# Patient Record
Sex: MALE | Race: WHITE
[De-identification: names, ages, dates, MRNs, and addresses within clinical notes are randomized per-mention and may not be internally consistent; named-entity substitution may affect disease eponyms.]

---

## 2021-10-01 ENCOUNTER — HOSPITAL ENCOUNTER (EMERGENCY)
Dept: HOSPITAL 47 - EC | Age: 51
LOS: 1 days | Discharge: HOME | End: 2021-10-02
Payer: COMMERCIAL

## 2021-10-01 VITALS — RESPIRATION RATE: 20 BRPM

## 2021-10-01 DIAGNOSIS — S43.004A: Primary | ICD-10-CM
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
## 2021-10-01 PROCEDURE — 96374 THER/PROPH/DIAG INJ IV PUSH: CPT

## 2021-10-01 PROCEDURE — 96375 TX/PRO/DX INJ NEW DRUG ADDON: CPT

## 2021-10-01 PROCEDURE — 99284 EMERGENCY DEPT VISIT MOD MDM: CPT

## 2021-10-01 PROCEDURE — 73030 X-RAY EXAM OF SHOULDER: CPT

## 2021-10-01 PROCEDURE — 96372 THER/PROPH/DIAG INJ SC/IM: CPT

## 2021-10-01 PROCEDURE — 73020 X-RAY EXAM OF SHOULDER: CPT

## 2021-10-01 PROCEDURE — 23650 CLTX SHO DSLC W/MNPJ WO ANES: CPT

## 2021-10-01 PROCEDURE — 73060 X-RAY EXAM OF HUMERUS: CPT

## 2021-10-01 NOTE — ED
Upper Extremity HPI





- General


Source: patient, RN notes reviewed


Mode of arrival: ambulatory





<Mercedes Strickland - Last Filed: 10/02/21 00:22>





<Carlos Tesfaye - Last Filed: 10/02/21 00:29>





- General


Chief Complaint: Extremity Injury, Upper


Stated Complaint: Fall 8 Ft,R shoulder injury


Time Seen by Provider: 10/01/21 19:44





- History of Present Illness


Initial Comments: 





Patient is a 51-year-old male presenting to the emergency department with 

complaints of right shoulder pain after he fell just prior to arrival.  Patient 

states he was attempting to climb up the ladder on his dear blind when he went 

to reach for another step and the step broke off, he fell mostly landing on his 

right shoulder.  He was about 7-8 feet up.  Denies loss of consciousness, did 

not hit his head.  His only complaint today is right shoulder pain.  He states 

he felt a little soreness in his left hip when he got up, but is able to 

ambulate without difficulty.  He denies any chest pain or shortness of breath, 

no rib pain, no abdominal pain.  Denies any nausea or vomiting.  He is not on 

blood thinners.  He has no further complaints at this time. (Mercedes Strickland)





- Related Data


                                    Allergies











Allergy/AdvReac Type Severity Reaction Status Date / Time


 


No Known Allergies Allergy   Verified 10/01/21 19:32














Review of Systems


ROS Other: All systems not noted in ROS Statement are negative.





<Mercedes Strickland - Last Filed: 10/02/21 00:22>


ROS Other: All systems not noted in ROS Statement are negative.





<Carlos Tesfaye - Last Filed: 10/02/21 00:29>


ROS Statement: 


Those systems with pertinent positive or pertinent negative responses have been 

documented in the HPI.








Past Medical History


Past Medical History: No Reported History


History of Any Multi-Drug Resistant Organisms: None Reported


Past Surgical History: No Surgical Hx Reported


Past Psychological History: No Psychological Hx Reported


Smoking Status: Never smoker


Past Alcohol Use History: Occasional


Past Drug Use History: None Reported





<Mercedes Strickland - Last Filed: 10/02/21 00:22>





General Exam





<Mercedes Strickland - Last Filed: 10/02/21 00:22>


General appearance: alert, in no apparent distress


Head exam: Present: atraumatic, normocephalic, normal inspection


Eye exam: Present: normal appearance, PERRL, EOMI.  Absent: scleral icterus, 

conjunctival injection, periorbital swelling


ENT exam: Present: normal exam, mucous membranes moist


Neck exam: Present: normal inspection.  Absent: tenderness, meningismus, 

lymphadenopathy


Respiratory exam: Present: normal lung sounds bilaterally.  Absent: respiratory 

distress, wheezes, rales, rhonchi, stridor


Cardiovascular Exam: Present: regular rate, normal rhythm, normal heart sounds. 

Absent: systolic murmur, diastolic murmur, rubs, gallop, clicks


GI/Abdominal exam: Present: soft, normal bowel sounds.  Absent: distended, 

tenderness, guarding, rebound, rigid


Extremities exam: Present: normal inspection, full ROM, normal capillary refill.

 Absent: tenderness, pedal edema, joint swelling, calf tenderness


Back exam: Present: normal inspection


Neurological exam: Present: alert, oriented X3, CN II-XII intact


Psychiatric exam: Present: normal affect, normal mood


Skin exam: Present: warm, dry, intact, normal color.  Absent: rash





<Carlos Tesfaye - Last Filed: 10/02/21 00:29>





- General Exam Comments


Initial Comments: 





GENERAL: 


Patient is well-developed and well-nourished.  Patient is nontoxic and in mild 

distress.





HEAD: 


Atraumatic, normocephalic.  He has no hematomas.





EYES:


Pupils equal round and reactive to light, extraocular movements intact, sclera 

anicteric, conjunctiva are normal.  Eyelids were unremarkable.





ENT: 


Nares patent, oropharynx clear without exudates.  Moist mucous membranes.





NECK: 


Normal range of motion, supple without lymphadenopathy or JVD.  No midline 

tenderness.





LUNGS:


Unlabored respirations.  Breath sounds clear to auscultation bilaterally and 

equal.  No wheezes rales or rhonchi.





HEART:


Regular rate and rhythm without murmurs, rubs or gallops.





ABDOMEN: 


Soft, nontender, normoactive bowel sounds.  No guarding, no rebound.  No masses 

appreciated.





: Deferred 





MUSCULOSKELETAL: 


Patient has pain throughout the anterior right shoulder joint, he is unable to 

move the joint secondary to pain, deformity at the joint.  He is neurovascular 

intact.  Rest of extremities with adequate strength and normal range of motion, 

no pitting or edema.  No clubbing or cyanosis.





NEUROLOGICAL: 


Patient is alert and oriented x 3.  Motor and sensory are also intact.  Cranial 

nerves II through XII grossly intact.  Symmetrical smile.  Normal speech, normal

gait.   





PSYCH:


Normal mood, normal affect.





SKIN:


 Warm, Dry, normal turgor, no rashes or lesions noted. (Mercedes Strickland)





Course





<Carlos Tesfaye - Last Filed: 10/02/21 00:29>





                                   Vital Signs











  10/01/21 10/01/21 10/01/21





  19:28 21:23 22:25


 


Temperature 98 F  


 


Pulse Rate 87 87 92


 


Respiratory 18 20 20





Rate   


 


Blood Pressure 141/90 136/65 138/57


 


O2 Sat by Pulse 98 95 95





Oximetry   














  10/01/21 10/01/21 10/01/21





  22:37 22:41 22:46


 


Temperature   


 


Pulse Rate 96 90 85


 


Respiratory 20 20 22





Rate   


 


Blood Pressure 124/79 99/28 98/74


 


O2 Sat by Pulse 96 93 L 92 L





Oximetry   














  10/01/21 10/01/21 10/01/21





  22:52 22:57 23:07


 


Temperature   


 


Pulse Rate 97  94


 


Respiratory 22 12 20





Rate   


 


Blood Pressure 109/95  135/73


 


O2 Sat by Pulse 96  98





Oximetry   














  10/01/21 10/01/21 10/01/21





  23:22 23:38 23:53


 


Temperature   


 


Pulse Rate 91 94 89


 


Respiratory 20 20 20





Rate   


 


Blood Pressure 163/100 148/60 130/85


 


O2 Sat by Pulse 98 98 96





Oximetry   














- Reevaluation(s)


Reevaluation #1: 





10/02/21 00:29


Medical records reviewed


Patient had conscious sedation with successful shoulder dislocation which is 

splinted


Patient awake and alert after sedation, able ambulate without significant 

difficulty, no significant complaints (Carlos Tesfaye)





Procedures





- Orthopedic Joint Reduction


  ** Joint #1


Consent Obtained: verbal consent, written consent


Side: right


Joint Reduction Location: shoulder


Analgesia: procedural sedation


Shoulder Technique Used (if applicable): traction/counter-traction, external 

rotation


Post-Reduction Neuro Exam: intact


Post-Reduction Vascular Exam: intact


Post Reduction X-Ray Obtained: Yes


Post Reduction X-Ray Results: reduced


Splint Applied: Yes (sling applied)


Patient Tolerated Procedure: well





<Mercedes Strickland - Last Filed: 10/02/21 00:22>





- Procedural Sedation


Procedural Sedation Start Time: 22:40


Procedural Sedation Stop Time: 23:20


Indications: fracture/dislocation reduction


ASA Class: I


Mallampati Airway Score: 2


Preparation: cardiac monitor applied, pulse oximeter, capnometry used, 

supplemental O2 applied, reversal agents at bedside


IV Propofol Dose (mgs): 260


Reversal Agents Used: Naloxone


Complications: hypoventilation


Interventions: oxygen applied, airway repositioned, use of reversal agent


Patient Tolerated Procedure: well, no complications





<Carlos Tesfaye - Last Filed: 10/02/21 00:29>





Medical Decision Making





<Mercedes Strickland - Last Filed: 10/02/21 00:22>





- Medical Decision Making





Patient is a 51-year-old male here with right shoulder pain after he fell of the

posterior blind.  Denies any other injuries today.  The rest of exam is 

unremarkable.  X-rays of the right shoulder reveal an anterior shoulder 

dislocation, no fracture seen.  Patient was given conscious sedation, was able 

to reduce the shoulder without difficulty.  Post reduction x-rays show normal 

alignment, no fractures.  Patient was observed for an additional hour after the 

procedure, his vitals have remained stable, he feels well enough to go home.  

His brother is driving home.  I did give him Tylenol 3 starter pack and tramadol

for pain.  He will continue to wear sling.  Patient lives in the [facility], he will follow up with his orthopedic there.  He is stable for 

discharge and he is in agreement with this plan of care.  Case discussed with 

Dr. Tesfaye.  (Mercedes Strickland)





Disposition


Is patient prescribed a controlled substance at d/c from ED?: No


Time of Disposition: 00:09





<Mercedes Strickland - Last Filed: 10/02/21 00:22>


Is patient prescribed a controlled substance at d/c from ED?: No





<Carlos Tesfaye - Last Filed: 10/02/21 00:29>


Clinical Impression: 


 Dislocation of right shoulder joint, Fall





Disposition: HOME SELF-CARE


Condition: Stable


Instructions (If sedation given, give patient instructions):  Shoulder 

Dislocation (ED), Moderate Sedation (ED)


Additional Instructions: 


Please return to the Emergency Department if symptoms worsen or any other 

concerns.


Alternate between Tylenol and Motrin for any discomfort. 


Apply ice to the shoulder 20 minutes at a time.  


Wear sling.


Please follow up with orthopedics in your home town.


Referrals: 


Nonstaff,Physician [Primary Care Provider] - 1-2 days

## 2021-10-01 NOTE — XR
EXAMINATION TYPE: XR shoulder limited RT

 

DATE OF EXAM: 10/1/2021

 

COMPARISON: Today

 

HISTORY: Post reduction

 

TECHNIQUE: Single view

 

FINDINGS: I see no fracture nor dislocation. Glenohumeral joint is anatomic.

 

IMPRESSION: Anatomic reduction. No fracture seen.

## 2021-10-01 NOTE — XR
EXAMINATION TYPE: XR shoulder complete RT

 

DATE OF EXAM: 10/1/2021

 

COMPARISON: NONE

 

HISTORY: Fall. Pain

 

TECHNIQUE: 3 views

 

FINDINGS: There is anterior dislocation of the glenohumeral joint. I see no fracture. Scapula is inta
ct.

 

IMPRESSION: Anterior shoulder joint dislocation.

## 2021-10-02 VITALS — DIASTOLIC BLOOD PRESSURE: 85 MMHG | SYSTOLIC BLOOD PRESSURE: 130 MMHG | HEART RATE: 89 BPM

## 2021-10-02 VITALS — TEMPERATURE: 98 F
